# Patient Record
Sex: MALE | Race: WHITE | NOT HISPANIC OR LATINO | URBAN - METROPOLITAN AREA
[De-identification: names, ages, dates, MRNs, and addresses within clinical notes are randomized per-mention and may not be internally consistent; named-entity substitution may affect disease eponyms.]

---

## 2021-04-12 ENCOUNTER — HOSPITAL ENCOUNTER (INPATIENT)
Facility: HOSPITAL | Age: 56
LOS: 3 days | Discharge: HOME | End: 2021-04-16
Attending: EMERGENCY MEDICINE | Admitting: SURGERY

## 2021-04-12 ENCOUNTER — APPOINTMENT (OUTPATIENT)
Dept: CT IMAGING | Facility: HOSPITAL | Age: 56
End: 2021-04-12

## 2021-04-12 DIAGNOSIS — K56.609 SMALL BOWEL OBSTRUCTION (HCC): Primary | ICD-10-CM

## 2021-04-12 LAB
ALBUMIN SERPL-MCNC: 5 G/DL (ref 3.5–5.2)
ALBUMIN/GLOB SERPL: 2.4 G/DL
ALP SERPL-CCNC: 97 U/L (ref 39–117)
ALT SERPL W P-5'-P-CCNC: 42 U/L (ref 1–41)
ANION GAP SERPL CALCULATED.3IONS-SCNC: 16.9 MMOL/L (ref 5–15)
AST SERPL-CCNC: 20 U/L (ref 1–40)
BASOPHILS # BLD AUTO: 0.05 10*3/MM3 (ref 0–0.2)
BASOPHILS NFR BLD AUTO: 0.5 % (ref 0–1.5)
BILIRUB SERPL-MCNC: 1 MG/DL (ref 0–1.2)
BILIRUB UR QL STRIP: NEGATIVE
BUN SERPL-MCNC: 14 MG/DL (ref 6–20)
BUN/CREAT SERPL: 17.5 (ref 7–25)
CALCIUM SPEC-SCNC: 10.3 MG/DL (ref 8.6–10.5)
CHLORIDE SERPL-SCNC: 100 MMOL/L (ref 98–107)
CLARITY UR: CLEAR
CO2 SERPL-SCNC: 24.1 MMOL/L (ref 22–29)
COLOR UR: YELLOW
CREAT SERPL-MCNC: 0.8 MG/DL (ref 0.76–1.27)
DEPRECATED RDW RBC AUTO: 40.2 FL (ref 37–54)
EOSINOPHIL # BLD AUTO: 0.05 10*3/MM3 (ref 0–0.4)
EOSINOPHIL NFR BLD AUTO: 0.5 % (ref 0.3–6.2)
ERYTHROCYTE [DISTWIDTH] IN BLOOD BY AUTOMATED COUNT: 12.3 % (ref 12.3–15.4)
GFR SERPL CREATININE-BSD FRML MDRD: 100 ML/MIN/1.73
GFR SERPL CREATININE-BSD FRML MDRD: 122 ML/MIN/1.73
GLOBULIN UR ELPH-MCNC: 2.1 GM/DL
GLUCOSE SERPL-MCNC: 375 MG/DL (ref 65–99)
GLUCOSE UR STRIP-MCNC: ABNORMAL MG/DL
HCT VFR BLD AUTO: 53.5 % (ref 37.5–51)
HGB BLD-MCNC: 18.3 G/DL (ref 13–17.7)
HGB UR QL STRIP.AUTO: NEGATIVE
HOLD SPECIMEN: NORMAL
HOLD SPECIMEN: NORMAL
IMM GRANULOCYTES # BLD AUTO: 0.04 10*3/MM3 (ref 0–0.05)
IMM GRANULOCYTES NFR BLD AUTO: 0.4 % (ref 0–0.5)
KETONES UR QL STRIP: ABNORMAL
LEUKOCYTE ESTERASE UR QL STRIP.AUTO: NEGATIVE
LIPASE SERPL-CCNC: 22 U/L (ref 13–60)
LYMPHOCYTES # BLD AUTO: 1.07 10*3/MM3 (ref 0.7–3.1)
LYMPHOCYTES NFR BLD AUTO: 11 % (ref 19.6–45.3)
MCH RBC QN AUTO: 30.5 PG (ref 26.6–33)
MCHC RBC AUTO-ENTMCNC: 34.2 G/DL (ref 31.5–35.7)
MCV RBC AUTO: 89.2 FL (ref 79–97)
MONOCYTES # BLD AUTO: 0.89 10*3/MM3 (ref 0.1–0.9)
MONOCYTES NFR BLD AUTO: 9.2 % (ref 5–12)
NEUTROPHILS NFR BLD AUTO: 7.61 10*3/MM3 (ref 1.7–7)
NEUTROPHILS NFR BLD AUTO: 78.4 % (ref 42.7–76)
NITRITE UR QL STRIP: NEGATIVE
NRBC BLD AUTO-RTO: 0 /100 WBC (ref 0–0.2)
PH UR STRIP.AUTO: <=5 [PH] (ref 5–8)
PLATELET # BLD AUTO: 238 10*3/MM3 (ref 140–450)
PMV BLD AUTO: 10 FL (ref 6–12)
POTASSIUM SERPL-SCNC: 4.9 MMOL/L (ref 3.5–5.2)
PROT SERPL-MCNC: 7.1 G/DL (ref 6–8.5)
PROT UR QL STRIP: NEGATIVE
RBC # BLD AUTO: 6 10*6/MM3 (ref 4.14–5.8)
SODIUM SERPL-SCNC: 141 MMOL/L (ref 136–145)
SP GR UR STRIP: >=1.03 (ref 1–1.03)
UROBILINOGEN UR QL STRIP: ABNORMAL
WBC # BLD AUTO: 9.71 10*3/MM3 (ref 3.4–10.8)
WHOLE BLOOD HOLD SPECIMEN: NORMAL
WHOLE BLOOD HOLD SPECIMEN: NORMAL

## 2021-04-12 PROCEDURE — 25010000002 IOPAMIDOL 61 % SOLUTION: Performed by: EMERGENCY MEDICINE

## 2021-04-12 PROCEDURE — 83690 ASSAY OF LIPASE: CPT | Performed by: EMERGENCY MEDICINE

## 2021-04-12 PROCEDURE — 81003 URINALYSIS AUTO W/O SCOPE: CPT | Performed by: EMERGENCY MEDICINE

## 2021-04-12 PROCEDURE — 85025 COMPLETE CBC W/AUTO DIFF WBC: CPT | Performed by: EMERGENCY MEDICINE

## 2021-04-12 PROCEDURE — 80053 COMPREHEN METABOLIC PANEL: CPT | Performed by: EMERGENCY MEDICINE

## 2021-04-12 PROCEDURE — 25010000002 ONDANSETRON PER 1 MG: Performed by: EMERGENCY MEDICINE

## 2021-04-12 PROCEDURE — 25010000002 HYDROMORPHONE 1 MG/ML SOLUTION: Performed by: EMERGENCY MEDICINE

## 2021-04-12 PROCEDURE — 74177 CT ABD & PELVIS W/CONTRAST: CPT

## 2021-04-12 PROCEDURE — 99285 EMERGENCY DEPT VISIT HI MDM: CPT

## 2021-04-12 PROCEDURE — 36415 COLL VENOUS BLD VENIPUNCTURE: CPT | Performed by: EMERGENCY MEDICINE

## 2021-04-12 RX ORDER — SODIUM CHLORIDE 0.9 % (FLUSH) 0.9 %
10 SYRINGE (ML) INJECTION AS NEEDED
Status: DISCONTINUED | OUTPATIENT
Start: 2021-04-12 | End: 2021-04-16 | Stop reason: HOSPADM

## 2021-04-12 RX ORDER — ONDANSETRON 2 MG/ML
4 INJECTION INTRAMUSCULAR; INTRAVENOUS ONCE
Status: COMPLETED | OUTPATIENT
Start: 2021-04-12 | End: 2021-04-12

## 2021-04-12 RX ADMIN — HYDROMORPHONE HYDROCHLORIDE 1 MG: 1 INJECTION, SOLUTION INTRAMUSCULAR; INTRAVENOUS; SUBCUTANEOUS at 21:03

## 2021-04-12 RX ADMIN — ONDANSETRON 4 MG: 2 INJECTION INTRAMUSCULAR; INTRAVENOUS at 21:03

## 2021-04-12 RX ADMIN — IOPAMIDOL 100 ML: 612 INJECTION, SOLUTION INTRAVENOUS at 21:52

## 2021-04-12 RX ADMIN — SODIUM CHLORIDE, POTASSIUM CHLORIDE, SODIUM LACTATE AND CALCIUM CHLORIDE 1000 ML: 600; 310; 30; 20 INJECTION, SOLUTION INTRAVENOUS at 21:04

## 2021-04-12 NOTE — ED NOTES
Pt states he is from [place].  States yesterday he developed abd fullness, tightness and belching.  States he passes a couple small pieces of stool today but it was not his normal.  Pt states he had a small bowel obstruction approx 5 years ago that resolved after gastric decompression and rest.  Mask placed on patient in triage.  Triage RN wearing mask throughout encounter.       Kodi Jason, ELSA  04/12/21 9770

## 2021-04-13 ENCOUNTER — APPOINTMENT (OUTPATIENT)
Dept: GENERAL RADIOLOGY | Facility: HOSPITAL | Age: 56
End: 2021-04-13

## 2021-04-13 PROBLEM — K56.609 SMALL BOWEL OBSTRUCTION (HCC): Status: ACTIVE | Noted: 2021-04-13

## 2021-04-13 LAB
ALBUMIN SERPL-MCNC: 4.3 G/DL (ref 3.5–5.2)
ALBUMIN/GLOB SERPL: 2 G/DL
ALP SERPL-CCNC: 86 U/L (ref 39–117)
ALT SERPL W P-5'-P-CCNC: 33 U/L (ref 1–41)
ANION GAP SERPL CALCULATED.3IONS-SCNC: 11.7 MMOL/L (ref 5–15)
AST SERPL-CCNC: 13 U/L (ref 1–40)
BILIRUB SERPL-MCNC: 0.9 MG/DL (ref 0–1.2)
BUN SERPL-MCNC: 14 MG/DL (ref 6–20)
BUN/CREAT SERPL: 18.2 (ref 7–25)
CALCIUM SPEC-SCNC: 8.7 MG/DL (ref 8.6–10.5)
CHLORIDE SERPL-SCNC: 102 MMOL/L (ref 98–107)
CO2 SERPL-SCNC: 26.3 MMOL/L (ref 22–29)
CREAT SERPL-MCNC: 0.77 MG/DL (ref 0.76–1.27)
DEPRECATED RDW RBC AUTO: 42.8 FL (ref 37–54)
ERYTHROCYTE [DISTWIDTH] IN BLOOD BY AUTOMATED COUNT: 12.5 % (ref 12.3–15.4)
GFR SERPL CREATININE-BSD FRML MDRD: 105 ML/MIN/1.73
GLOBULIN UR ELPH-MCNC: 2.1 GM/DL
GLUCOSE BLDC GLUCOMTR-MCNC: 221 MG/DL (ref 70–130)
GLUCOSE BLDC GLUCOMTR-MCNC: 255 MG/DL (ref 70–130)
GLUCOSE BLDC GLUCOMTR-MCNC: 266 MG/DL (ref 70–130)
GLUCOSE SERPL-MCNC: 273 MG/DL (ref 65–99)
HCT VFR BLD AUTO: 50.5 % (ref 37.5–51)
HGB BLD-MCNC: 17 G/DL (ref 13–17.7)
MCH RBC QN AUTO: 31.1 PG (ref 26.6–33)
MCHC RBC AUTO-ENTMCNC: 33.7 G/DL (ref 31.5–35.7)
MCV RBC AUTO: 92.5 FL (ref 79–97)
PLATELET # BLD AUTO: 201 10*3/MM3 (ref 140–450)
PMV BLD AUTO: 10 FL (ref 6–12)
POTASSIUM SERPL-SCNC: 4.2 MMOL/L (ref 3.5–5.2)
PROT SERPL-MCNC: 6.4 G/DL (ref 6–8.5)
RBC # BLD AUTO: 5.46 10*6/MM3 (ref 4.14–5.8)
SARS-COV-2 ORF1AB RESP QL NAA+PROBE: DETECTED
SODIUM SERPL-SCNC: 140 MMOL/L (ref 136–145)
WBC # BLD AUTO: 5.1 10*3/MM3 (ref 3.4–10.8)

## 2021-04-13 PROCEDURE — 25010000002 ONDANSETRON PER 1 MG: Performed by: SURGERY

## 2021-04-13 PROCEDURE — 80053 COMPREHEN METABOLIC PANEL: CPT | Performed by: SURGERY

## 2021-04-13 PROCEDURE — 99222 1ST HOSP IP/OBS MODERATE 55: CPT | Performed by: SURGERY

## 2021-04-13 PROCEDURE — 74018 RADEX ABDOMEN 1 VIEW: CPT

## 2021-04-13 PROCEDURE — 25010000002 HYDROMORPHONE PER 4 MG: Performed by: SURGERY

## 2021-04-13 PROCEDURE — U0004 COV-19 TEST NON-CDC HGH THRU: HCPCS | Performed by: EMERGENCY MEDICINE

## 2021-04-13 PROCEDURE — 63710000001 INSULIN LISPRO (HUMAN) PER 5 UNITS: Performed by: SURGERY

## 2021-04-13 PROCEDURE — 82962 GLUCOSE BLOOD TEST: CPT

## 2021-04-13 PROCEDURE — 85027 COMPLETE CBC AUTOMATED: CPT | Performed by: SURGERY

## 2021-04-13 RX ORDER — AMLODIPINE BESYLATE AND BENAZEPRIL HYDROCHLORIDE 10; 40 MG/1; MG/1
1 CAPSULE ORAL DAILY
COMMUNITY

## 2021-04-13 RX ORDER — FAMOTIDINE 10 MG/ML
20 INJECTION, SOLUTION INTRAVENOUS EVERY 12 HOURS SCHEDULED
Status: DISCONTINUED | OUTPATIENT
Start: 2021-04-13 | End: 2021-04-16 | Stop reason: HOSPADM

## 2021-04-13 RX ORDER — NICOTINE POLACRILEX 4 MG
15 LOZENGE BUCCAL
Status: DISCONTINUED | OUTPATIENT
Start: 2021-04-13 | End: 2021-04-16 | Stop reason: HOSPADM

## 2021-04-13 RX ORDER — HYDROMORPHONE HYDROCHLORIDE 1 MG/ML
0.5 INJECTION, SOLUTION INTRAMUSCULAR; INTRAVENOUS; SUBCUTANEOUS
Status: DISCONTINUED | OUTPATIENT
Start: 2021-04-13 | End: 2021-04-16 | Stop reason: HOSPADM

## 2021-04-13 RX ORDER — DEXTROSE MONOHYDRATE 25 G/50ML
25 INJECTION, SOLUTION INTRAVENOUS
Status: DISCONTINUED | OUTPATIENT
Start: 2021-04-13 | End: 2021-04-16 | Stop reason: HOSPADM

## 2021-04-13 RX ORDER — SODIUM CHLORIDE 0.9 % (FLUSH) 0.9 %
10 SYRINGE (ML) INJECTION EVERY 12 HOURS SCHEDULED
Status: DISCONTINUED | OUTPATIENT
Start: 2021-04-13 | End: 2021-04-16 | Stop reason: HOSPADM

## 2021-04-13 RX ORDER — INSULIN LISPRO 100 [IU]/ML
0-7 INJECTION, SOLUTION INTRAVENOUS; SUBCUTANEOUS
Status: DISCONTINUED | OUTPATIENT
Start: 2021-04-13 | End: 2021-04-16 | Stop reason: HOSPADM

## 2021-04-13 RX ORDER — ONDANSETRON 2 MG/ML
4 INJECTION INTRAMUSCULAR; INTRAVENOUS EVERY 6 HOURS PRN
Status: DISCONTINUED | OUTPATIENT
Start: 2021-04-13 | End: 2021-04-16 | Stop reason: HOSPADM

## 2021-04-13 RX ORDER — ASPIRIN 81 MG/1
81 TABLET, CHEWABLE ORAL DAILY
COMMUNITY

## 2021-04-13 RX ORDER — SODIUM CHLORIDE 9 MG/ML
65 INJECTION, SOLUTION INTRAVENOUS CONTINUOUS
Status: DISCONTINUED | OUTPATIENT
Start: 2021-04-13 | End: 2021-04-16

## 2021-04-13 RX ORDER — NALOXONE HCL 0.4 MG/ML
0.4 VIAL (ML) INJECTION
Status: DISCONTINUED | OUTPATIENT
Start: 2021-04-13 | End: 2021-04-16 | Stop reason: HOSPADM

## 2021-04-13 RX ORDER — SODIUM CHLORIDE 0.9 % (FLUSH) 0.9 %
10 SYRINGE (ML) INJECTION AS NEEDED
Status: DISCONTINUED | OUTPATIENT
Start: 2021-04-13 | End: 2021-04-16 | Stop reason: HOSPADM

## 2021-04-13 RX ADMIN — INSULIN LISPRO 4 UNITS: 100 INJECTION, SOLUTION INTRAVENOUS; SUBCUTANEOUS at 14:01

## 2021-04-13 RX ADMIN — AMLODIPINE BESYLATE: 10 TABLET ORAL at 14:01

## 2021-04-13 RX ADMIN — FAMOTIDINE 20 MG: 10 INJECTION INTRAVENOUS at 11:20

## 2021-04-13 RX ADMIN — FAMOTIDINE 20 MG: 10 INJECTION INTRAVENOUS at 21:29

## 2021-04-13 RX ADMIN — SODIUM CHLORIDE, PRESERVATIVE FREE 10 ML: 5 INJECTION INTRAVENOUS at 21:29

## 2021-04-13 RX ADMIN — FAMOTIDINE 20 MG: 10 INJECTION INTRAVENOUS at 01:31

## 2021-04-13 RX ADMIN — HYDROMORPHONE HYDROCHLORIDE 0.5 MG: 1 INJECTION, SOLUTION INTRAMUSCULAR; INTRAVENOUS; SUBCUTANEOUS at 01:31

## 2021-04-13 RX ADMIN — SODIUM CHLORIDE 100 ML/HR: 9 INJECTION, SOLUTION INTRAVENOUS at 21:29

## 2021-04-13 RX ADMIN — SODIUM CHLORIDE 100 ML/HR: 9 INJECTION, SOLUTION INTRAVENOUS at 10:18

## 2021-04-13 RX ADMIN — ONDANSETRON 4 MG: 2 INJECTION INTRAMUSCULAR; INTRAVENOUS at 13:39

## 2021-04-13 RX ADMIN — INSULIN LISPRO 3 UNITS: 100 INJECTION, SOLUTION INTRAVENOUS; SUBCUTANEOUS at 18:26

## 2021-04-13 RX ADMIN — SODIUM CHLORIDE, PRESERVATIVE FREE 10 ML: 5 INJECTION INTRAVENOUS at 01:34

## 2021-04-13 RX ADMIN — HYDROMORPHONE HYDROCHLORIDE 0.5 MG: 1 INJECTION, SOLUTION INTRAMUSCULAR; INTRAVENOUS; SUBCUTANEOUS at 13:39

## 2021-04-13 RX ADMIN — SODIUM CHLORIDE 100 ML/HR: 9 INJECTION, SOLUTION INTRAVENOUS at 02:05

## 2021-04-13 NOTE — ED NOTES
Nursing report ED to floor  Jeffry May  55 y.o.  male    HPI (triage note):   Chief Complaint   Patient presents with   • Abdominal Pain       Admitting doctor:   Ivan Pacheco MD    Admitting diagnosis:   The encounter diagnosis was Small bowel obstruction (CMS/HCC).    Code status:   Current Code Status     Date Active Code Status Order ID Comments User Context       4/13/2021 0045 CPR [id number]  Ivan Pacheco MD ED     Advance Care Planning Activity      Questions for Current Code Status     Question Answer Comment    Code Status CPR     Medical Interventions (Level of Support Prior to Arrest) Full           Allergies:   Penicillins    Weight:       04/12/21 2103   Weight: 111 kg (245 lb)       Most recent vitals:   Vitals:    04/13/21 0021 04/13/21 0125 04/13/21 0127 04/13/21 0130   BP:   (!) 148/104 155/98   BP Location:   Right arm    Patient Position:   Sitting    Pulse: 96  99    Resp:       Temp:       SpO2:  91% 92% 93%   Weight:       Height:           Active LDAs/IV Access:   Lines, Drains & Airways    Active LDAs     Name:   Placement date:   Placement time:   Site:   Days:    Peripheral IV 04/12/21 2103 Right;Anterior Forearm   04/12/21 2103    Forearm   less than 1    NG/OG Tube Nasogastric 18 Fr Left nostril   04/13/21 0015    Left nostril   less than 1                Labs (abnormal labs have a star):   Labs Reviewed   COVID-19,APTIMA PANTHER,[facility],NP/OP SWAB IN UTM/VTM/[facility] MEDIA,24 HR TAT - Abnormal; Notable for the following components:       Result Value    COVID19 Detected (*)     All other components within normal limits    Narrative:     Fact sheet for providers: https://www.fda.gov/media/948617/download     Fact sheet for patients: https://www.fda.gov/media/712783/download    Test performed by RT PCR.   COMPREHENSIVE METABOLIC PANEL - Abnormal; Notable for the following components:    Glucose 375 (*)     ALT (SGPT) 42 (*)     Anion Gap 16.9 (*)     All  other components within normal limits    Narrative:     GFR Normal >60  Chronic Kidney Disease <60  Kidney Failure <15     URINALYSIS W/ MICROSCOPIC IF INDICATED (NO CULTURE) - Abnormal; Notable for the following components:    Glucose, UA >=1000 mg/dL (3+) (*)     Ketones, UA 80 mg/dL (3+) (*)     All other components within normal limits    Narrative:     Urine microscopic not indicated.   CBC WITH AUTO DIFFERENTIAL - Abnormal; Notable for the following components:    RBC 6.00 (*)     Hemoglobin 18.3 (*)     Hematocrit 53.5 (*)     Neutrophil % 78.4 (*)     Lymphocyte % 11.0 (*)     Neutrophils, Absolute 7.61 (*)     All other components within normal limits   LIPASE - Normal   COVID PRE-OP / PRE-PROCEDURE SCREENING ORDER (NO ISOLATION)    Narrative:     The following orders were created for panel order COVID PRE-OP / PRE-PROCEDURE SCREENING ORDER (NO ISOLATION) - Swab, Nasopharynx.  Procedure                               Abnormality         Status                     ---------                               -----------         ------                     COVID-19,APTIMA PANTHER,...[[id number]]  Abnormal            Final result                 Please view results for these tests on the individual orders.   RAINBOW DRAW    Narrative:     The following orders were created for panel order [place] Draw.  Procedure                               Abnormality         Status                     ---------                               -----------         ------                     Light Blue Top[[id number]]                                   Final result               Green Top (Gel)[[id number]]                                  Final result               Lavender Top[[id number]]                                     Final result               Gold Top - SST[[id number]]                                   Final result                 Please view results for these tests on the individual orders.   CBC (NO DIFF)   COMPREHENSIVE METABOLIC  PANEL   POCT GLUCOSE FINGERSTICK   POCT GLUCOSE FINGERSTICK   POCT GLUCOSE FINGERSTICK   POCT GLUCOSE FINGERSTICK   CBC AND DIFFERENTIAL    Narrative:     The following orders were created for panel order CBC & Differential.  Procedure                               Abnormality         Status                     ---------                               -----------         ------                     CBC Auto Differential[[id number]]        Abnormal            Final result                 Please view results for these tests on the individual orders.   LIGHT BLUE TOP   GREEN TOP   LAVENDER TOP   GOLD TOP - SST       EKG:   No orders to display       Meds given in ED:   Medications   sodium chloride 0.9 % flush 10 mL (has no administration in time range)   sodium chloride 0.9 % flush 10 mL (10 mL Intravenous Given 4/13/21 0134)   sodium chloride 0.9 % flush 10 mL (has no administration in time range)   sodium chloride 0.9 % infusion (100 mL/hr Intravenous New Bag 4/13/21 0205)   HYDROmorphone (DILAUDID) injection 0.5 mg (0.5 mg Intravenous Given 4/13/21 0131)     And   naloxone (NARCAN) injection 0.4 mg (has no administration in time range)   ondansetron (ZOFRAN) injection 4 mg (has no administration in time range)   famotidine (PEPCID) injection 20 mg (20 mg Intravenous Given 4/13/21 0131)   dextrose (GLUTOSE) oral gel 15 g (has no administration in time range)   dextrose (D50W) 25 g/ 50mL Intravenous Solution 25 g (has no administration in time range)   glucagon (human recombinant) (GLUCAGEN DIAGNOSTIC) injection 1 mg (has no administration in time range)   insulin lispro (ADMELOG) injection 0-7 Units (has no administration in time range)   amLODIPine (NORVASC) 10 mg, lisinopril (PRINIVIL,ZESTRIL) 40 mg (has no administration in time range)   HYDROmorphone (DILAUDID) injection 1 mg (1 mg Intravenous Given 4/12/21 2103)   ondansetron (ZOFRAN) injection 4 mg (4 mg Intravenous Given 4/12/21 2103)   lactated ringers bolus  1,000 mL (0 mL Intravenous Stopped 4/12/21 2327)   iopamidol (ISOVUE-300) 61 % injection 100 mL (100 mL Intravenous Given 4/12/21 2152)       Imaging results:  CT Abdomen Pelvis With Contrast    Result Date: 4/12/2021  Electronically signed by Rufus Early M.D. on 04-12-21 at 2232      Ambulatory status:   - independent    Social issues:   Social History     Socioeconomic History   • Marital status:      Spouse name: Not on file   • Number of children: Not on file   • Years of education: Not on file   • Highest education level: Not on file    Nursing report ED to floor       Taya Reyna, RN  04/13/21 4475

## 2021-04-13 NOTE — ED NOTES
This RN wore gloves, mask, eye protection and all other necessary PPE while performing pt care.     Taya Reyna, RN  04/13/21 0451

## 2021-04-13 NOTE — PROGRESS NOTES
"Chief Complaint:    Small bowel obstruction    Subjective:    Passed a little flatus.  No BM.  Still having pain when ambulating.    Objective:    Vitals:    04/13/21 0533 04/13/21 0801 04/13/21 1015 04/13/21 1347   BP:  (!) 161/108  (!) 159/104   BP Location:  Right arm  Right arm   Patient Position:  Sitting  Sitting   Pulse: 81 99  90   Resp:  16  16   Temp:  97.2 °F (36.2 °C)  98.8 °F (37.1 °C)   TempSrc:  Oral  Oral   SpO2: 95% 94%  93%   Weight:   113 kg (248 lb 9.6 oz)    Height:   175.3 cm (69\")        Lungs: Clear  Heart: Regular  Abdomen: Soft.  Distended.  Bowel sounds hypoactive.  Extremities: Warm    Labs reviewed.  WBC 5.10, Hgb 17.0, platelets 209.  Creatinine 0.77.  CO2 26.3.  AlkP 86, ALT 33, AST 13, T bili 0.9.    I reviewed the images and the report of a KUB performed earlier today.  There is gas in distended loops of small bowel.  There is a paucity of gas in the colon.    Assessment:    Small bowel obstruction  Prior history of inguinal hernia repair and cholecystectomy  Diabetes  Hypertension  Obesity    Plan:    He looks nontoxic again today and is relatively comfortable.  Laboratory assessment is not particularly worrisome.  I recommended continued nonoperative management for another day.  I will repeat KUB tomorrow.    "

## 2021-04-13 NOTE — H&P
CHIEF COMPLAINT:    Small bowel obstruction    HISTORY OF PRESENT ILLNESS:    Jeffry Shahid is a 55 y.o. male who is traveling from [place] with his wife on vacation to visit [place].  They left yesterday.  They made the trip by car.  Last night he began experiencing colicky abdominal pain, nausea, and distention.  It got better overnight, but then resumed again today.  The pain was reminiscent of a prior episode of bowel obstruction that he experienced in 2015.  The pain is sharp and centrally located.  The last bowel movement was yesterday.  It was normal.  Today he had a small bowel movement.  There are no urinary complaints.  He underwent previous cholecystectomy and hernia repair surgeries.      No past medical history on file.    No past surgical history on file.      Current Facility-Administered Medications:   •  famotidine (PEPCID) injection 20 mg, 20 mg, Intravenous, Q12H, Ivan Pacheco MD  •  HYDROmorphone (DILAUDID) injection 0.5 mg, 0.5 mg, Intravenous, Q2H PRN **AND** naloxone (NARCAN) injection 0.4 mg, 0.4 mg, Intravenous, Q5 Min PRN, Ivan Pacheco MD  •  ondansetron (ZOFRAN) injection 4 mg, 4 mg, Intravenous, Q6H PRN, Ivan Pacheco MD  •  sodium chloride 0.9 % flush 10 mL, 10 mL, Intravenous, PRN, Christos Monaco II, MD  •  sodium chloride 0.9 % flush 10 mL, 10 mL, Intravenous, Q12H, Ivan Pacheco MD  •  sodium chloride 0.9 % flush 10 mL, 10 mL, Intravenous, PRN, Ivan Pacheco MD  •  sodium chloride 0.9 % infusion, 100 mL/hr, Intravenous, Continuous, Ivan Pacheco MD  No current outpatient medications on file.    Allergies   Allergen Reactions   • Penicillins Rash       No family history on file.    Social History     Socioeconomic History   • Marital status:      Spouse name: Not on file   • Number of children: Not on file   • Years of education: Not on file   • Highest education level: Not on file       Review of Systems   Constitutional:  "Negative for fever.   HENT: Negative for trouble swallowing.    Eyes: Negative for visual disturbance.   Respiratory: Negative for shortness of breath.    Cardiovascular: Negative for chest pain.   Gastrointestinal: Positive for abdominal distention, abdominal pain, nausea and vomiting.   Endocrine: Negative for polyuria.   Genitourinary: Negative for dysuria.   Neurological: Negative for syncope.   Psychiatric/Behavioral: Negative for confusion.       Objective     /97   Pulse 96   Temp 97.8 °F (36.6 °C)   Resp 16   Ht 175.3 cm (69\")   Wt 111 kg (245 lb)   SpO2 91%   BMI 36.18 kg/m²     Physical Exam  Vitals reviewed.   Constitutional:       General: He is not in acute distress.     Appearance: He is well-developed. He is obese. He is not diaphoretic.   HENT:      Head: Normocephalic and atraumatic.   Eyes:      General: No scleral icterus.     Conjunctiva/sclera: Conjunctivae normal.   Neck:      Trachea: No tracheal deviation.   Cardiovascular:      Rate and Rhythm: Normal rate and regular rhythm.      Heart sounds: Normal heart sounds. No murmur heard.     Pulmonary:      Effort: Pulmonary effort is normal. No respiratory distress.      Breath sounds: Normal breath sounds. No wheezing or rales.   Abdominal:      General: There is distension.      Palpations: Abdomen is soft.      Tenderness: There is abdominal tenderness (mild diffuse tenderness). There is no guarding or rebound.      Hernia: No hernia is present.      Comments: Bowel sounds are hypoactive.   Musculoskeletal:         General: No deformity.      Cervical back: Neck supple.   Skin:     General: Skin is warm and dry.   Neurological:      Mental Status: He is alert and oriented to person, place, and time.   Psychiatric:         Behavior: Behavior normal.         DIAGNOSTIC DATA:    WBC 9.71, Hgb 18.3, platelets 238.  Creat 0.80.  CO2 24.1.  Glucose 375.  AlkP 97, ALT 42, AST 20, T bili 1.0.  Urinalysis: 3+ glucose, 3+ ketones    I " reviewed the images and report of a CT scan of the abdomen pelvis performed today in the emergency department.  There is no free air.  There is no free fluid.  There are postoperative changes of inguinal hernia repair.  There is a small periumbilical hernia containing fat.  Gallbladder has been removed.  Stomach is filled with fluid.  Proximal small bowel is distended.  Distal small bowel loops are decompressed, but do not seem to reconstitute with gas and stool just proximal to the terminal ileum.  There is gas and stool in the colon.    ASSESSMENT:    Partial small bowel obstruction   Diabetes   Hypertension    PLAN:    Nasogastric tube has been placed in the ED.  He does not look toxic.  There is no leukocytosis, renal insufficiency, or hepatic dysfunction.  I think a brief period of conservative management is warranted.  I will admit for IV fluids and nasogastric decompression.  I will order an abdominal x-ray in the morning to help assist with management of the nasogastric tube.

## 2021-04-13 NOTE — ED NOTES
Attempt to call report x1, receiving RN currently unavailable and will call back.      Taya Reyna, RN  04/13/21 0569

## 2021-04-13 NOTE — ED PROVIDER NOTES
" EMERGENCY DEPARTMENT ENCOUNTER    Room Number:  47/47  Date of encounter:  4/13/2021  PCP: Provider, No Known  Historian: Patient      HPI:  Chief Complaint: Abdominal pain  A complete HPI/ROS/PMH/PSH/SH/FH are unobtainable due to: None    Context: Jeffry Shahid is a 55 y.o. male who presents to the ED c/o abdominal pain.  Onset yesterday.  He got better overnight and then today has been constant pain.  At its worst, it is 10/10 intensity.  It is a \"twisting\" sensation with spasms.  It is generalized pain.  Nothing makes this better or worse.  He has a history of hypertension and diabetes.  He is allergic to penicillin.  No fever, diarrhea, constipation, urinary symptoms.  He did throw up once after trying some magnesium citrate earlier today.      PAST MEDICAL HISTORY  Active Ambulatory Problems     Diagnosis Date Noted   • No Active Ambulatory Problems     Resolved Ambulatory Problems     Diagnosis Date Noted   • No Resolved Ambulatory Problems     No Additional Past Medical History         PAST SURGICAL HISTORY  No past surgical history on file.      FAMILY HISTORY  No family history on file.      SOCIAL HISTORY  Social History     Socioeconomic History   • Marital status:      Spouse name: Not on file   • Number of children: Not on file   • Years of education: Not on file   • Highest education level: Not on file         ALLERGIES  Penicillins        REVIEW OF SYSTEMS  Review of Systems     All systems reviewed and negative except for those discussed in HPI.       PHYSICAL EXAM    I have reviewed the triage vital signs and nursing notes.    ED Triage Vitals [04/12/21 1833]   Temp Heart Rate Resp BP SpO2   97.8 °F (36.6 °C) 105 16 (!) 168/112 98 %      Temp src Heart Rate Source Patient Position BP Location FiO2 (%)   -- -- -- Left arm --       Physical Exam  GENERAL: not distressed  HENT: nares patent  EYES: no scleral icterus  CV: regular rhythm, regular rate  RESPIRATORY: normal effort, clear to " auscultation bilaterally  ABDOMEN: soft, generalized abdominal tenderness that is worse on the right side  MUSCULOSKELETAL: no deformity  NEURO: alert, moves all extremities, follows commands  SKIN: warm, dry, no rash to the abdomen        LAB RESULTS  Recent Results (from the past 24 hour(s))   Comprehensive Metabolic Panel    Collection Time: 04/12/21  7:21 PM    Specimen: Blood   Result Value Ref Range    Glucose 375 (H) 65 - 99 mg/dL    BUN 14 6 - 20 mg/dL    Creatinine 0.80 0.76 - 1.27 mg/dL    Sodium 141 136 - 145 mmol/L    Potassium 4.9 3.5 - 5.2 mmol/L    Chloride 100 98 - 107 mmol/L    CO2 24.1 22.0 - 29.0 mmol/L    Calcium 10.3 8.6 - 10.5 mg/dL    Total Protein 7.1 6.0 - 8.5 g/dL    Albumin 5.00 3.50 - 5.20 g/dL    ALT (SGPT) 42 (H) 1 - 41 U/L    AST (SGOT) 20 1 - 40 U/L    Alkaline Phosphatase 97 39 - 117 U/L    Total Bilirubin 1.0 0.0 - 1.2 mg/dL    eGFR Non African Amer 100 >60 mL/min/1.73    eGFR  African Amer 122 >60 mL/min/1.73    Globulin 2.1 gm/dL    A/G Ratio 2.4 g/dL    BUN/Creatinine Ratio 17.5 7.0 - 25.0    Anion Gap 16.9 (H) 5.0 - 15.0 mmol/L   Lipase    Collection Time: 04/12/21  7:21 PM    Specimen: Blood   Result Value Ref Range    Lipase 22 13 - 60 U/L   Light Blue Top    Collection Time: 04/12/21  7:21 PM   Result Value Ref Range    Extra Tube hold for add-on    Green Top (Gel)    Collection Time: 04/12/21  7:21 PM   Result Value Ref Range    Extra Tube Hold for add-ons.    Lavender Top    Collection Time: 04/12/21  7:21 PM   Result Value Ref Range    Extra Tube hold for add-on    Gold Top - SST    Collection Time: 04/12/21  7:21 PM   Result Value Ref Range    Extra Tube Hold for add-ons.    CBC Auto Differential    Collection Time: 04/12/21  7:21 PM    Specimen: Blood   Result Value Ref Range    WBC 9.71 3.40 - 10.80 10*3/mm3    RBC 6.00 (H) 4.14 - 5.80 10*6/mm3    Hemoglobin 18.3 (H) 13.0 - 17.7 g/dL    Hematocrit 53.5 (H) 37.5 - 51.0 %    MCV 89.2 79.0 - 97.0 fL    MCH 30.5 26.6 - 33.0  pg    MCHC 34.2 31.5 - 35.7 g/dL    RDW 12.3 12.3 - 15.4 %    RDW-SD 40.2 37.0 - 54.0 fl    MPV 10.0 6.0 - 12.0 fL    Platelets 238 140 - 450 10*3/mm3    Neutrophil % 78.4 (H) 42.7 - 76.0 %    Lymphocyte % 11.0 (L) 19.6 - 45.3 %    Monocyte % 9.2 5.0 - 12.0 %    Eosinophil % 0.5 0.3 - 6.2 %    Basophil % 0.5 0.0 - 1.5 %    Immature Grans % 0.4 0.0 - 0.5 %    Neutrophils, Absolute 7.61 (H) 1.70 - 7.00 10*3/mm3    Lymphocytes, Absolute 1.07 0.70 - 3.10 10*3/mm3    Monocytes, Absolute 0.89 0.10 - 0.90 10*3/mm3    Eosinophils, Absolute 0.05 0.00 - 0.40 10*3/mm3    Basophils, Absolute 0.05 0.00 - 0.20 10*3/mm3    Immature Grans, Absolute 0.04 0.00 - 0.05 10*3/mm3    nRBC 0.0 0.0 - 0.2 /100 WBC   Urinalysis With Microscopic If Indicated (No Culture) - Urine, Clean Catch    Collection Time: 04/12/21  7:28 PM    Specimen: Urine, Clean Catch   Result Value Ref Range    Color, UA Yellow Yellow, Straw    Appearance, UA Clear Clear    pH, UA <=5.0 5.0 - 8.0    Specific Gravity, UA >=1.030 1.005 - 1.030    Glucose, UA >=1000 mg/dL (3+) (A) Negative    Ketones, UA 80 mg/dL (3+) (A) Negative    Bilirubin, UA Negative Negative    Blood, UA Negative Negative    Protein, UA Negative Negative    Leuk Esterase, UA Negative Negative    Nitrite, UA Negative Negative    Urobilinogen, UA 0.2 E.U./dL 0.2 - 1.0 E.U./dL       Ordered the above labs and independently reviewed the results.        RADIOLOGY  CT Abdomen Pelvis With Contrast    Result Date: 4/12/2021  Patient: YVON CARRILLO  Time Out: 22:32 Exam(s): CT ABDOMEN + PELVIS With Contrast EXAM:   CT Abdomen and Pelvis With Intravenous Contrast CLINICAL HISTORY:    generalized abd pain, right>left  Reason for exam: generalized abd pain, right>left. TECHNIQUE:   Axial computed tomography images of the abdomen and pelvis with intravenous contrast.  CTDI is 30.4 mGy and DLP is 1878 mGy-cm.  This CT exam was performed according to the principle of ALARA (As Low As Reasonably Achievable) by  using one or more of the following dose reduction techniques: automated exposure control, adjustment of the mA and or kV according to patient size, and or use of iterative reconstruction technique. COMPARISON:   No relevant prior studies available. FINDINGS:   Lung bases:  3 mm right lower lobe subpleural nodule.  Mild left basilar discoid atelectasis or scarring.  ABDOMEN:   Liver:  Diffuse low-attenuation of the liver.  2.7 cm left hepatic hypodensity, likely a cyst.  Calcified right hepatic granuloma.   Gallbladder and bile ducts:  Cholecystectomy.  No biliary dilatation.   Pancreas:  Unremarkable.  No mass.  No ductal dilation.   Spleen:  Unremarkable.  No splenomegaly.   Adrenals:  Unremarkable.  No mass.   Kidneys and ureters:  Unremarkable.  No solid mass.  No hydronephrosis.   Stomach and bowel:  Dilated small bowel with multiple air-fluid levels.  Decompressed distal small bowel.  Possible transition point in the right lower abdomen.  No mucosal thickening.  PELVIS:   Appendix:  Unremarkable appendix.   Bladder:  Unremarkable.  No mass.   Reproductive:  Enlarged prostate gland with central calcifications.  ABDOMEN and PELVIS:   Intraperitoneal space:  Unremarkable.  No free air.  No significant fluid collection.   Bones joints:  Multilevel degenerative changes of the thoracolumbar spine.  No acute fracture.  No dislocation.   Soft tissues:  Evidence of left inguinal hernia repair.  Small fat- containing periumbilical ventral abdominal wall hernia.   Vasculature:  Unremarkable.  No abdominal aortic aneurysm.   Lymph nodes:  Unremarkable.  No enlarged lymph nodes. IMPRESSION:     1.  Small bowel obstruction.  Possible transition point in the right lower abdomen. 2.  Fatty liver.  2.7 cm left hepatic cyst. 3.  Prostatomegaly. 4.  3 mm right lower lobe nodule.  Statistically, this is likely benign; however, if there is history of smoking or other risk factors for malignancy, a follow-up noncontrast chest CT may  be considered in 1 year.     Electronically signed by Rufus Early M.D. on 04-12-21 at 2232      I ordered the above noted radiological studies. Reviewed by me and discussed with radiologist.  See dictation for official radiology interpretation.      PROCEDURES    Procedures      MEDICATIONS GIVEN IN ER    Medications   sodium chloride 0.9 % flush 10 mL (has no administration in time range)   sodium chloride 0.9 % flush 10 mL (10 mL Intravenous Given 4/13/21 0134)   sodium chloride 0.9 % flush 10 mL (has no administration in time range)   sodium chloride 0.9 % infusion (100 mL/hr Intravenous New Bag 4/13/21 0205)   HYDROmorphone (DILAUDID) injection 0.5 mg (0.5 mg Intravenous Given 4/13/21 0131)     And   naloxone (NARCAN) injection 0.4 mg (has no administration in time range)   ondansetron (ZOFRAN) injection 4 mg (has no administration in time range)   famotidine (PEPCID) injection 20 mg (20 mg Intravenous Given 4/13/21 0131)   dextrose (GLUTOSE) oral gel 15 g (has no administration in time range)   dextrose (D50W) 25 g/ 50mL Intravenous Solution 25 g (has no administration in time range)   glucagon (human recombinant) (GLUCAGEN DIAGNOSTIC) injection 1 mg (has no administration in time range)   insulin lispro (ADMELOG) injection 0-7 Units (has no administration in time range)   amLODIPine (NORVASC) 10 mg, lisinopril (PRINIVIL,ZESTRIL) 40 mg (has no administration in time range)   HYDROmorphone (DILAUDID) injection 1 mg (1 mg Intravenous Given 4/12/21 2103)   ondansetron (ZOFRAN) injection 4 mg (4 mg Intravenous Given 4/12/21 2103)   lactated ringers bolus 1,000 mL (0 mL Intravenous Stopped 4/12/21 2327)   iopamidol (ISOVUE-300) 61 % injection 100 mL (100 mL Intravenous Given 4/12/21 2152)         PROGRESS, DATA ANALYSIS, CONSULTS, AND MEDICAL DECISION MAKING    All labs have been independently reviewed by me.  All radiology studies have been reviewed by me and discussed with radiologist dictating the report.   EKG's  independently viewed and interpreted by me.  Discussion below represents my analysis of pertinent findings related to patient's condition, differential diagnosis, treatment plan and final disposition.    Differential diagnosis includes but not limited to:  - hepatobiliary pathology such as cholecystitis, cholangitis, and symptomatic cholelithiasis  - Pancreatitis  - Dyspepsia  - Small bowel obstruction  - Appendicitis  - Diverticulitis  - UTI including pyelonephritis  - Ureteral stone  - Zoster  - Colitis, including infectious and ischemic  - Atypical ACS    ED Course as of Apr 13 0310   Mon Apr 12, 2021 2048 Glucose(!): 375 [TD]   2048 Anion Gap(!): 16.9 [TD]   2048 WBC: 9.71 [TD]   2048 Hemoglobin(!): 18.3 [TD]   2048 Creatinine: 0.80 [TD]   2048 Leukocytes, UA: Negative [TD]   2048 Nitrite, UA: Negative [TD]      ED Course User Index  [TD] Christos Monaco II, MD       CT imaging shows small bowel obstruction.    I discussed the case with Dr. Pacheco, general surgery.  He will admit.  We reviewed the patient's history and CT imaging.    I personally viewed the CT scan and see rather significant distention of the stomach.  Therefore, NG tube placed to low intermittent wall suction.    PPE: Both the patient and I wore a surgical mask throughout the entire patient encounter. I wore protective goggles.     AS OF 03:10 EDT VITALS:    BP - 155/98  HR - 99  TEMP - 97.8 °F (36.6 °C)  O2 SATS - 93%        DIAGNOSIS  Final diagnoses:   Small bowel obstruction (CMS/HCC)         DISPOSITION  Admit           Christos Monaco II, MD  04/13/21 0310

## 2021-04-14 ENCOUNTER — APPOINTMENT (OUTPATIENT)
Dept: GENERAL RADIOLOGY | Facility: HOSPITAL | Age: 56
End: 2021-04-14

## 2021-04-14 LAB
ANION GAP SERPL CALCULATED.3IONS-SCNC: 11.6 MMOL/L (ref 5–15)
BUN SERPL-MCNC: 13 MG/DL (ref 6–20)
BUN/CREAT SERPL: 16.5 (ref 7–25)
CALCIUM SPEC-SCNC: 8.9 MG/DL (ref 8.6–10.5)
CHLORIDE SERPL-SCNC: 105 MMOL/L (ref 98–107)
CO2 SERPL-SCNC: 25.4 MMOL/L (ref 22–29)
CREAT SERPL-MCNC: 0.79 MG/DL (ref 0.76–1.27)
DEPRECATED RDW RBC AUTO: 38.2 FL (ref 37–54)
ERYTHROCYTE [DISTWIDTH] IN BLOOD BY AUTOMATED COUNT: 11.8 % (ref 12.3–15.4)
GFR SERPL CREATININE-BSD FRML MDRD: 102 ML/MIN/1.73
GLUCOSE BLDC GLUCOMTR-MCNC: 177 MG/DL (ref 70–130)
GLUCOSE BLDC GLUCOMTR-MCNC: 182 MG/DL (ref 70–130)
GLUCOSE BLDC GLUCOMTR-MCNC: 195 MG/DL (ref 70–130)
GLUCOSE BLDC GLUCOMTR-MCNC: 200 MG/DL (ref 70–130)
GLUCOSE BLDC GLUCOMTR-MCNC: 207 MG/DL (ref 70–130)
GLUCOSE SERPL-MCNC: 233 MG/DL (ref 65–99)
HCT VFR BLD AUTO: 45.5 % (ref 37.5–51)
HGB BLD-MCNC: 16.4 G/DL (ref 13–17.7)
MCH RBC QN AUTO: 32 PG (ref 26.6–33)
MCHC RBC AUTO-ENTMCNC: 36 G/DL (ref 31.5–35.7)
MCV RBC AUTO: 88.9 FL (ref 79–97)
PLATELET # BLD AUTO: 176 10*3/MM3 (ref 140–450)
PMV BLD AUTO: 10.1 FL (ref 6–12)
POTASSIUM SERPL-SCNC: 4.3 MMOL/L (ref 3.5–5.2)
RBC # BLD AUTO: 5.12 10*6/MM3 (ref 4.14–5.8)
SODIUM SERPL-SCNC: 142 MMOL/L (ref 136–145)
WBC # BLD AUTO: 5.76 10*3/MM3 (ref 3.4–10.8)

## 2021-04-14 PROCEDURE — 63710000001 INSULIN LISPRO (HUMAN) PER 5 UNITS: Performed by: SURGERY

## 2021-04-14 PROCEDURE — 74018 RADEX ABDOMEN 1 VIEW: CPT

## 2021-04-14 PROCEDURE — 85027 COMPLETE CBC AUTOMATED: CPT | Performed by: SURGERY

## 2021-04-14 PROCEDURE — 82962 GLUCOSE BLOOD TEST: CPT

## 2021-04-14 PROCEDURE — 80048 BASIC METABOLIC PNL TOTAL CA: CPT | Performed by: SURGERY

## 2021-04-14 PROCEDURE — 99232 SBSQ HOSP IP/OBS MODERATE 35: CPT | Performed by: SURGERY

## 2021-04-14 RX ADMIN — SODIUM CHLORIDE 100 ML/HR: 9 INJECTION, SOLUTION INTRAVENOUS at 08:32

## 2021-04-14 RX ADMIN — SODIUM CHLORIDE 100 ML/HR: 9 INJECTION, SOLUTION INTRAVENOUS at 18:31

## 2021-04-14 RX ADMIN — INSULIN LISPRO 2 UNITS: 100 INJECTION, SOLUTION INTRAVENOUS; SUBCUTANEOUS at 08:23

## 2021-04-14 RX ADMIN — INSULIN LISPRO 3 UNITS: 100 INJECTION, SOLUTION INTRAVENOUS; SUBCUTANEOUS at 11:34

## 2021-04-14 RX ADMIN — FAMOTIDINE 20 MG: 10 INJECTION INTRAVENOUS at 08:23

## 2021-04-14 RX ADMIN — AMLODIPINE BESYLATE: 10 TABLET ORAL at 08:23

## 2021-04-14 RX ADMIN — SODIUM CHLORIDE, PRESERVATIVE FREE 10 ML: 5 INJECTION INTRAVENOUS at 21:49

## 2021-04-14 RX ADMIN — INSULIN LISPRO 2 UNITS: 100 INJECTION, SOLUTION INTRAVENOUS; SUBCUTANEOUS at 19:02

## 2021-04-14 RX ADMIN — FAMOTIDINE 20 MG: 10 INJECTION INTRAVENOUS at 21:49

## 2021-04-14 NOTE — PROGRESS NOTES
Discharge Planning Assessment  [facility]     Patient Name: Jeffry Shahid  MRN: [id number]  Today's Date: 4/14/2021    Admit Date: 4/12/2021    Discharge Needs Assessment     Row Name 04/14/21 1442       Living Environment    Lives With  spouse    Current Living Arrangements  home/apartment/condo    Primary Care Provided by  self    Family Caregiver if Needed  spouse    Quality of Family Relationships  involved    Able to Return to Prior Arrangements  yes       Resource/Environmental Concerns    Resource/Environmental Concerns  none       Transition Planning    Patient/Family Anticipates Transition to  home with family    Patient/Family Anticipated Services at Transition  none    Transportation Anticipated  family or friend will provide       Discharge Needs Assessment    Readmission Within the Last 30 Days  no previous admission in last 30 days    Equipment Currently Used at Home  none    Concerns to be Addressed  adjustment to diagnosis/illness        Discharge Plan     Row Name 04/14/21 1443       Plan    Plan  Home w/ spouse    Plan Comments  Face sheet verified. Patient resides in [place] w/ his spouse. He is normally IADL and he does not use any DME. Plan is to return home w/ spouse at dc. He would like to use [facility] for any new dc meds. CCP will follow progress.        Continued Care and Services - Admitted Since 4/12/2021    Coordination has not been started for this encounter.         Demographic Summary    No documentation.       Functional Status    No documentation.       Psychosocial    No documentation.       Abuse/Neglect    No documentation.       Legal    No documentation.       Substance Abuse    No documentation.       Patient Forms    No documentation.           Cyndi Moreno RN

## 2021-04-14 NOTE — PROGRESS NOTES
Chief Complaint:    Small bowel obstruction    Subjective:    Passed a little flatus.  No BM.  Feels better while ambulating.    Objective:    Vitals:    04/13/21 1710 04/13/21 1900 04/13/21 2358 04/14/21 0700   BP:  157/96 149/98 155/98   BP Location:  Right arm Right arm Right arm   Patient Position:  Lying Lying Lying   Pulse:  83 86 73   Resp:  20 20 20   Temp:  99.2 °F (37.3 °C) 99.1 °F (37.3 °C) 98.7 °F (37.1 °C)   TempSrc:  Oral Oral Oral   SpO2: 92% 94% 93% 91%   Weight:       Height:           Lungs: Clear  Heart: Regular  Abdomen: Soft.  Distended.  Bowel sounds hypoactive.  Extremities: Warm    Labs reviewed.  WBC 5.76, Hgb 16.4, platelets 176.  Creatinine 0.79.  CO2 25.4.    I reviewed the images and the report of a KUB performed earlier today.  There is gas in distended loops of small bowel, but it seems somewhat reduced from yesterday.  There is a greater amount of gas in the colon.    Assessment:    Small bowel obstruction  Prior history of inguinal hernia repair and cholecystectomy  Diabetes  Hypertension  Obesity    Plan:    He looks better today and is relatively comfortable.  Laboratory assessment is not particularly worrisome.  He would like to try continued nonoperative management for another day.  I will repeat KUB tomorrow.  I encouraged ambulation.  I think he should the be off the nasogastric suctioning for greater of the time during the day to allow for more ambulation.

## 2021-04-15 ENCOUNTER — APPOINTMENT (OUTPATIENT)
Dept: GENERAL RADIOLOGY | Facility: HOSPITAL | Age: 56
End: 2021-04-15

## 2021-04-15 LAB
ANION GAP SERPL CALCULATED.3IONS-SCNC: 15.3 MMOL/L (ref 5–15)
BUN SERPL-MCNC: 13 MG/DL (ref 6–20)
BUN/CREAT SERPL: 18.8 (ref 7–25)
CALCIUM SPEC-SCNC: 8.9 MG/DL (ref 8.6–10.5)
CHLORIDE SERPL-SCNC: 100 MMOL/L (ref 98–107)
CO2 SERPL-SCNC: 25.7 MMOL/L (ref 22–29)
CREAT SERPL-MCNC: 0.69 MG/DL (ref 0.76–1.27)
GFR SERPL CREATININE-BSD FRML MDRD: 119 ML/MIN/1.73
GLUCOSE BLDC GLUCOMTR-MCNC: 148 MG/DL (ref 70–130)
GLUCOSE BLDC GLUCOMTR-MCNC: 156 MG/DL (ref 70–130)
GLUCOSE BLDC GLUCOMTR-MCNC: 160 MG/DL (ref 70–130)
GLUCOSE BLDC GLUCOMTR-MCNC: 172 MG/DL (ref 70–130)
GLUCOSE SERPL-MCNC: 189 MG/DL (ref 65–99)
POTASSIUM SERPL-SCNC: 3.6 MMOL/L (ref 3.5–5.2)
SODIUM SERPL-SCNC: 141 MMOL/L (ref 136–145)

## 2021-04-15 PROCEDURE — 80048 BASIC METABOLIC PNL TOTAL CA: CPT | Performed by: SURGERY

## 2021-04-15 PROCEDURE — 99232 SBSQ HOSP IP/OBS MODERATE 35: CPT | Performed by: SURGERY

## 2021-04-15 PROCEDURE — 74018 RADEX ABDOMEN 1 VIEW: CPT

## 2021-04-15 PROCEDURE — 63710000001 INSULIN LISPRO (HUMAN) PER 5 UNITS: Performed by: SURGERY

## 2021-04-15 PROCEDURE — 82962 GLUCOSE BLOOD TEST: CPT

## 2021-04-15 RX ORDER — POTASSIUM CHLORIDE 1.5 G/1.77G
40 POWDER, FOR SOLUTION ORAL AS NEEDED
Status: DISCONTINUED | OUTPATIENT
Start: 2021-04-15 | End: 2021-04-16 | Stop reason: HOSPADM

## 2021-04-15 RX ORDER — POTASSIUM CHLORIDE 750 MG/1
40 TABLET, FILM COATED, EXTENDED RELEASE ORAL AS NEEDED
Status: DISCONTINUED | OUTPATIENT
Start: 2021-04-15 | End: 2021-04-16 | Stop reason: HOSPADM

## 2021-04-15 RX ORDER — POTASSIUM CHLORIDE 7.45 MG/ML
10 INJECTION INTRAVENOUS
Status: DISCONTINUED | OUTPATIENT
Start: 2021-04-15 | End: 2021-04-16 | Stop reason: HOSPADM

## 2021-04-15 RX ADMIN — POTASSIUM CHLORIDE 40 MEQ: 750 TABLET, EXTENDED RELEASE ORAL at 17:39

## 2021-04-15 RX ADMIN — LINAGLIPTIN 5 MG: 5 TABLET, FILM COATED ORAL at 11:37

## 2021-04-15 RX ADMIN — SODIUM CHLORIDE 65 ML/HR: 9 INJECTION, SOLUTION INTRAVENOUS at 17:45

## 2021-04-15 RX ADMIN — PHENOL 2 SPRAY: 1.5 LIQUID ORAL at 11:45

## 2021-04-15 RX ADMIN — INSULIN LISPRO 2 UNITS: 100 INJECTION, SOLUTION INTRAVENOUS; SUBCUTANEOUS at 13:32

## 2021-04-15 RX ADMIN — INSULIN LISPRO 2 UNITS: 100 INJECTION, SOLUTION INTRAVENOUS; SUBCUTANEOUS at 07:56

## 2021-04-15 RX ADMIN — INSULIN LISPRO 2 UNITS: 100 INJECTION, SOLUTION INTRAVENOUS; SUBCUTANEOUS at 17:40

## 2021-04-15 RX ADMIN — FAMOTIDINE 20 MG: 10 INJECTION INTRAVENOUS at 07:56

## 2021-04-15 RX ADMIN — FAMOTIDINE 20 MG: 10 INJECTION INTRAVENOUS at 20:49

## 2021-04-15 RX ADMIN — AMLODIPINE BESYLATE: 10 TABLET ORAL at 08:22

## 2021-04-15 RX ADMIN — POTASSIUM CHLORIDE 40 MEQ: 750 TABLET, EXTENDED RELEASE ORAL at 11:40

## 2021-04-15 NOTE — PROGRESS NOTES
Chief Complaint:    Small bowel obstruction    Subjective:    He had BM x 3.  No nausea or vomiting.  Sore throat from the nasogastric tube.    Objective:    Vitals:    04/14/21 1300 04/14/21 1700 04/14/21 2350 04/15/21 0741   BP: 159/99 146/91 155/89 156/94   BP Location: Right arm Right arm Right arm Right arm   Patient Position: Lying Lying Lying Lying   Pulse: 93 103 95 88   Resp: 20 20 18 14   Temp: 98.1 °F (36.7 °C) 98.6 °F (37 °C) 98 °F (36.7 °C) 99 °F (37.2 °C)   TempSrc: Oral Oral Oral Oral   SpO2: 91% 93% 91% 91%   Weight:       Height:           Lungs: Clear  Heart: Regular  Abdomen: Soft.  Distended.  Bowel sounds present.  Extremities: Warm    Labs reviewed.  Glucose 1 72-1 99  Creatinine 0.69,  CO2 25.4.  K 3.6    I reviewed the images and the report of a KUB performed earlier today.  There is gas in distended loops of small bowel, but it seems reduced from yesterday.      Assessment:    Small bowel obstruction  Prior history of inguinal hernia repair and cholecystectomy  Diabetes  Hypertension  Obesity    Plan:    He had a bowel movement.  He has been ambulating well.  I removed the nasogastric tube today.  KUB does not look completely normal, so I will only allow ice chips and popsicles today.  Resume Januvia.  Replace potassium, and decrease IV fluids.

## 2021-04-16 ENCOUNTER — READMISSION MANAGEMENT (OUTPATIENT)
Dept: CALL CENTER | Facility: HOSPITAL | Age: 56
End: 2021-04-16

## 2021-04-16 VITALS
HEIGHT: 69 IN | HEART RATE: 72 BPM | BODY MASS INDEX: 36.82 KG/M2 | OXYGEN SATURATION: 92 % | RESPIRATION RATE: 18 BRPM | SYSTOLIC BLOOD PRESSURE: 151 MMHG | DIASTOLIC BLOOD PRESSURE: 89 MMHG | WEIGHT: 248.6 LBS | TEMPERATURE: 98.3 F

## 2021-04-16 LAB
ANION GAP SERPL CALCULATED.3IONS-SCNC: 8 MMOL/L (ref 5–15)
BUN SERPL-MCNC: 11 MG/DL (ref 6–20)
BUN/CREAT SERPL: 15.5 (ref 7–25)
CALCIUM SPEC-SCNC: 8.9 MG/DL (ref 8.6–10.5)
CHLORIDE SERPL-SCNC: 103 MMOL/L (ref 98–107)
CO2 SERPL-SCNC: 29 MMOL/L (ref 22–29)
CREAT SERPL-MCNC: 0.71 MG/DL (ref 0.76–1.27)
GFR SERPL CREATININE-BSD FRML MDRD: 115 ML/MIN/1.73
GLUCOSE BLDC GLUCOMTR-MCNC: 146 MG/DL (ref 70–130)
GLUCOSE BLDC GLUCOMTR-MCNC: 158 MG/DL (ref 70–130)
GLUCOSE SERPL-MCNC: 177 MG/DL (ref 65–99)
POTASSIUM SERPL-SCNC: 4.4 MMOL/L (ref 3.5–5.2)
SODIUM SERPL-SCNC: 140 MMOL/L (ref 136–145)

## 2021-04-16 PROCEDURE — 82962 GLUCOSE BLOOD TEST: CPT

## 2021-04-16 PROCEDURE — 80048 BASIC METABOLIC PNL TOTAL CA: CPT | Performed by: SURGERY

## 2021-04-16 PROCEDURE — 99238 HOSP IP/OBS DSCHRG MGMT 30/<: CPT | Performed by: SURGERY

## 2021-04-16 RX ORDER — ONDANSETRON 4 MG/1
4 TABLET, FILM COATED ORAL DAILY PRN
Qty: 30 TABLET | Refills: 1 | Status: SHIPPED | OUTPATIENT
Start: 2021-04-16 | End: 2022-04-16

## 2021-04-16 RX ADMIN — AMLODIPINE BESYLATE: 10 TABLET ORAL at 08:22

## 2021-04-16 RX ADMIN — FAMOTIDINE 20 MG: 10 INJECTION INTRAVENOUS at 08:22

## 2021-04-16 RX ADMIN — LINAGLIPTIN 5 MG: 5 TABLET, FILM COATED ORAL at 08:22

## 2021-04-16 NOTE — DISCHARGE SUMMARY
DATE OF ADMISSION:  4/12/2021  DATE OF DISCHARGE:  4/16/2021    ATTENDING:        Ivan Pacheco M.D.       GENERAL SURGERY    PRIMARY CARE PHYSICIAN: SAUNDRA    CONSULTS:    None    PRINCIPAL DIAGNOSIS:     Small bowel obstruction    DISCHARGE DIAGNOSES:     Hypertension  Diabetes    HOSPITAL PROCEDURES:     None    HOSPITALCOURSE:   Is a very pleasant 55-year-old man who was vacationing in [place].  He has a history of inguinal hernia repair and cholecystectomy.  He presented to the emergency department of [facility] with abdominal distention.  He has history of small bowel obstruction in the past, and he recognized his symptoms as very similar to that episode.  CT imaging of the abdomen and pelvis on the day of admission was consistent with partial small bowel obstruction.  Nasogastric tube was placed.  He was admitted for bowel rest and IV fluids.  Over the course of the next 4 days his GI function returned and abdominal distention reduced.  On the day of discharge she was tolerating a clear liquid diet.  He desired to go home.  He is instructed to maintain a liquid diet for the next several days. I will provide Zofran for any residual nausea.    ACTIVITY:  Okay to shower.  Ambulate and climb stairs as tolerated.  No lifting over 10 lbs for 2 weeks.  Okay to drive if not taking pain medications.    DIET:  Clear liquid diet for the next several days    FOLLOW UP:  He will call his primary care physician in [place] for an appointment over the next month.

## 2021-04-16 NOTE — PROGRESS NOTES
Chief Complaint:    Small bowel obstruction    Subjective:    Passing flatus.  Had a few more bowel movements this morning.  Wants to go home.    Objective:    Vitals:    04/15/21 1525 04/15/21 1948 04/15/21 2339 04/16/21 0725   BP: 158/97 159/94 141/89 151/89   BP Location: Right arm Right arm Right arm Right arm   Patient Position: Lying Lying Lying Lying   Pulse: 82 76 67 72   Resp: 16 16 16 18   Temp: 98.6 °F (37 °C) 98.7 °F (37.1 °C) 98.8 °F (37.1 °C) 98.3 °F (36.8 °C)   TempSrc: Oral Oral Oral Oral   SpO2: 92%      Weight:       Height:           Lungs: Clear  Heart: Regular  Abdomen: Soft.  Less distended.  Bowel sounds present.  Extremities: Warm    Labs reviewed.  Glucose 148-177  Creatinine 0.71,  CO2 29.0.  K 4.4    Assessment:    Small bowel obstruction  Prior history of inguinal hernia repair and cholecystectomy  Diabetes  Hypertension  Obesity    Plan:    Try clear liquid diet this morning.  If he does okay with that, I think it will be okay for him to go home on a liquid diet through the weekend.  I will provide Zofran for

## 2021-04-16 NOTE — PAYOR COMM NOTE
"Jeffry Carrillo (55 y.o. Male)       PLEASE SEE ATTACHED DC SUMMARY    REF#[id number]    THANK YOU    LAWANDA GOODEN LPN CCP    Date of Birth Social Security Number Address Home Phone MRN    1965  [address] [id number] [phone] [id number]    Religious Marital Status          Confucianist        Admission Date Admission Type Admitting Provider Attending Provider Department, Room/Bed    4/12/21 Emergency Ivan Pacheco MD  [address], [address]    Discharge Date Discharge Disposition Discharge Destination        4/16/2021 Home or Self Care              Attending Provider: (none)   Allergies: Penicillins    Isolation: None   Infection: COVID (History) (04/13/21)   Code Status: CPR    Ht: 175.3 cm (69\")   Wt: 113 kg (248 lb 9.6 oz)    Admission Cmt: None   Principal Problem: Small bowel obstruction (CMS/HCC) [K56.609]                 Active Insurance as of 4/12/2021     Primary Coverage     Payor Plan Insurance Group Employer/Plan Group    [employer] [facility] [employer] [facility] [id number]     Payor Plan Address Payor Plan Phone Number Payor Plan Fax Number Effective Dates    [address] [phone]  1/1/2021 - None Entered    [facility] [id number]       Subscriber Name Subscriber Birth Date Member ID       JEFFRY CARRILLO 1965 [id number]                 Emergency Contacts      (Rel.) Home Phone Work Phone Mobile Phone    DAREK CARRILLO (Spouse) [phone] -- [phone]               Discharge Summary      Ivan Pacheco MD at 04/16/21 0932        DATE OF ADMISSION:  4/12/2021  DATE OF DISCHARGE:  4/16/2021    ATTENDING:        Ivan Pacheco M.D.       GENERAL SURGERY    PRIMARY CARE PHYSICIAN: SAUNDRA    CONSULTS:    None    PRINCIPAL DIAGNOSIS:     Small bowel obstruction    DISCHARGE DIAGNOSES:     Hypertension  Diabetes    HOSPITAL PROCEDURES:     None    HOSPITALCOURSE:   Is a very pleasant 55-year-old man who was " vacationing in [place].  He has a history of inguinal hernia repair and cholecystectomy.  He presented to the emergency department of [facility] with abdominal distention.  He has history of small bowel obstruction in the past, and he recognized his symptoms as very similar to that episode.  CT imaging of the abdomen and pelvis on the day of admission was consistent with partial small bowel obstruction.  Nasogastric tube was placed.  He was admitted for bowel rest and IV fluids.  Over the course of the next 4 days his GI function returned and abdominal distention reduced.  On the day of discharge she was tolerating a clear liquid diet.  He desired to go home.  He is instructed to maintain a liquid diet for the next several days. I will provide Zofran for any residual nausea.    ACTIVITY:  Okay to shower.  Ambulate and climb stairs as tolerated.  No lifting over 10 lbs for 2 weeks.  Okay to drive if not taking pain medications.    DIET:  Clear liquid diet for the next several days    FOLLOW UP:  He will call his primary care physician in [place] for an appointment over the next month.      Electronically signed by Ivan Pacheco MD at 04/16/21 2556

## 2021-04-16 NOTE — PROGRESS NOTES
Discharge Planning Assessment  [facility]     Patient Name: Jeffry Shahid  MRN: [id number]  Today's Date: 4/16/2021    Admit Date: 4/12/2021    Discharge Needs Assessment    No documentation.       Discharge Plan     Row Name 04/16/21 1027       Plan    Plan  Home    Final Discharge Disposition Code  01 - home or self-care    Final Note  Home        Continued Care and Services - Admitted Since 4/12/2021    Coordination has not been started for this encounter.       Expected Discharge Date and Time     Expected Discharge Date Expected Discharge Time    Apr 16, 2021         Demographic Summary    No documentation.       Functional Status    No documentation.       Psychosocial    No documentation.       Abuse/Neglect    No documentation.       Legal    No documentation.       Substance Abuse    No documentation.       Patient Forms    No documentation.           Randee Freedman RN

## 2021-04-17 NOTE — OUTREACH NOTE
Prep Survey      Responses   [facility] facility patient discharged from?  [place]   Is LACE score < 7 ?  No   Emergency Room discharge w/ pulse ox?  No   Eligibility  Readm Mgmt   Discharge diagnosis  SBO - resolved, DM   Does the patient have one of the following disease processes/diagnoses(primary or secondary)?  Other   Does the patient have Home health ordered?  No   Is there a DME ordered?  No   Prep survey completed?  Yes          Janice Wyatt RN

## 2021-04-19 ENCOUNTER — READMISSION MANAGEMENT (OUTPATIENT)
Dept: CALL CENTER | Facility: HOSPITAL | Age: 56
End: 2021-04-19

## 2021-04-19 NOTE — OUTREACH NOTE
Medical Week 1 Survey      Responses   [facility] patient discharged from?  [place]   Does the patient have one of the following disease processes/diagnoses(primary or secondary)?  Other   Week 1 attempt successful?  No   Unsuccessful attempts  Attempt 1          Rosie Sher RN

## 2021-04-21 ENCOUNTER — READMISSION MANAGEMENT (OUTPATIENT)
Dept: CALL CENTER | Facility: HOSPITAL | Age: 56
End: 2021-04-21

## 2021-04-21 NOTE — OUTREACH NOTE
Medical Week 1 Survey      Responses   [facility] patient discharged from?  [place]   Does the patient have one of the following disease processes/diagnoses(primary or secondary)?  Other   Week 1 attempt successful?  No   Unsuccessful attempts  Attempt 2          Paula Lopez RN

## 2021-04-22 ENCOUNTER — READMISSION MANAGEMENT (OUTPATIENT)
Dept: CALL CENTER | Facility: HOSPITAL | Age: 56
End: 2021-04-22

## 2021-04-22 NOTE — OUTREACH NOTE
Medical Week 1 Survey      Responses   [facility] patient discharged from?  [place]   Does the patient have one of the following disease processes/diagnoses(primary or secondary)?  Other   Week 1 attempt successful?  No   Unsuccessful attempts  Attempt 3          Bharat Miguel RN

## 2021-05-03 ENCOUNTER — READMISSION MANAGEMENT (OUTPATIENT)
Dept: CALL CENTER | Facility: HOSPITAL | Age: 56
End: 2021-05-03

## 2021-05-03 NOTE — OUTREACH NOTE
Medical Week 3 Survey      Responses   [facility] patient discharged from?  [place]   Does the patient have one of the following disease processes/diagnoses(primary or secondary)?  Other   Week 3 attempt successful?  No   Unsuccessful attempts  Attempt 1          Paula Lopez RN

## 2021-05-06 ENCOUNTER — READMISSION MANAGEMENT (OUTPATIENT)
Dept: CALL CENTER | Facility: HOSPITAL | Age: 56
End: 2021-05-06

## 2021-05-06 NOTE — OUTREACH NOTE
Medical Week 3 Survey      Responses   [facility] patient discharged from?  [place]   Does the patient have one of the following disease processes/diagnoses(primary or secondary)?  Other   Week 3 attempt successful?  No   Unsuccessful attempts  Attempt 2          Tennille Stern RN